# Patient Record
Sex: FEMALE | Race: WHITE | NOT HISPANIC OR LATINO | Employment: OTHER | URBAN - METROPOLITAN AREA
[De-identification: names, ages, dates, MRNs, and addresses within clinical notes are randomized per-mention and may not be internally consistent; named-entity substitution may affect disease eponyms.]

---

## 2017-02-08 ENCOUNTER — HOSPITAL ENCOUNTER (OUTPATIENT)
Dept: OTHER | Facility: HOSPITAL | Age: 72
Setting detail: SPECIMEN
Discharge: HOME | End: 2017-02-08
Attending: UROLOGY | Admitting: UROLOGY

## 2017-02-09 LAB — SPECIMEN SOURCE: NORMAL

## 2017-03-22 ENCOUNTER — HOSPITAL ENCOUNTER (OUTPATIENT)
Dept: MAMMOGRAPHY | Facility: HOSPITAL | Age: 72
Discharge: HOME | End: 2017-03-22
Attending: INTERNAL MEDICINE | Admitting: INTERNAL MEDICINE

## 2017-03-22 DIAGNOSIS — C50.911 MALIGNANT NEOPLASM OF RIGHT FEMALE BREAST, UNSPECIFIED SITE OF BREAST: ICD-10-CM

## 2017-03-22 PROCEDURE — G0202 SCR MAMMO BI INCL CAD: HCPCS

## 2017-03-28 ENCOUNTER — LAB (OUTPATIENT)
Dept: LAB | Facility: HOSPITAL | Age: 72
End: 2017-03-28

## 2017-03-28 ENCOUNTER — OFFICE VISIT (OUTPATIENT)
Dept: ONCOLOGY | Facility: CLINIC | Age: 72
End: 2017-03-28

## 2017-03-28 VITALS
WEIGHT: 176.4 LBS | SYSTOLIC BLOOD PRESSURE: 110 MMHG | DIASTOLIC BLOOD PRESSURE: 82 MMHG | BODY MASS INDEX: 31.25 KG/M2 | HEIGHT: 63 IN | HEART RATE: 66 BPM | RESPIRATION RATE: 16 BRPM | TEMPERATURE: 97.8 F | OXYGEN SATURATION: 95 %

## 2017-03-28 DIAGNOSIS — C50.611 MALIGNANT NEOPLASM OF AXILLARY TAIL OF RIGHT FEMALE BREAST (HCC): Primary | ICD-10-CM

## 2017-03-28 DIAGNOSIS — Z12.31 ENCOUNTER FOR SCREENING MAMMOGRAM FOR BREAST CANCER: ICD-10-CM

## 2017-03-28 LAB
BASOPHILS # BLD AUTO: 0.05 10*3/MM3 (ref 0–0.1)
BASOPHILS NFR BLD AUTO: 0.8 % (ref 0–1.1)
DEPRECATED RDW RBC AUTO: 42.7 FL (ref 37–49)
EOSINOPHIL # BLD AUTO: 0.22 10*3/MM3 (ref 0–0.36)
EOSINOPHIL NFR BLD AUTO: 3.4 % (ref 1–5)
ERYTHROCYTE [DISTWIDTH] IN BLOOD BY AUTOMATED COUNT: 13.2 % (ref 11.7–14.5)
HCT VFR BLD AUTO: 37.2 % (ref 34–45)
HGB BLD-MCNC: 12.4 G/DL (ref 11.5–14.9)
IMM GRANULOCYTES # BLD: 0.02 10*3/MM3 (ref 0–0.03)
IMM GRANULOCYTES NFR BLD: 0.3 % (ref 0–0.5)
LYMPHOCYTES # BLD AUTO: 1.65 10*3/MM3 (ref 1–3.5)
LYMPHOCYTES NFR BLD AUTO: 25.5 % (ref 20–49)
MCH RBC QN AUTO: 29.5 PG (ref 27–33)
MCHC RBC AUTO-ENTMCNC: 33.3 G/DL (ref 32–35)
MCV RBC AUTO: 88.4 FL (ref 83–97)
MONOCYTES # BLD AUTO: 0.57 10*3/MM3 (ref 0.25–0.8)
MONOCYTES NFR BLD AUTO: 8.8 % (ref 4–12)
NEUTROPHILS # BLD AUTO: 3.96 10*3/MM3 (ref 1.5–7)
NEUTROPHILS NFR BLD AUTO: 61.2 % (ref 39–75)
NRBC BLD MANUAL-RTO: 0 /100 WBC (ref 0–0)
PLATELET # BLD AUTO: 222 10*3/MM3 (ref 150–375)
PMV BLD AUTO: 9.8 FL (ref 8.9–12.1)
RBC # BLD AUTO: 4.21 10*6/MM3 (ref 3.9–5)
WBC NRBC COR # BLD: 6.47 10*3/MM3 (ref 4–10)

## 2017-03-28 PROCEDURE — 85025 COMPLETE CBC W/AUTO DIFF WBC: CPT

## 2017-03-28 PROCEDURE — 36415 COLL VENOUS BLD VENIPUNCTURE: CPT

## 2017-03-28 PROCEDURE — 99213 OFFICE O/P EST LOW 20 MIN: CPT | Performed by: INTERNAL MEDICINE

## 2017-03-28 RX ORDER — LISINOPRIL 10 MG/1
20 TABLET ORAL
COMMUNITY
Start: 2017-03-23 | End: 2019-08-12

## 2017-03-28 RX ORDER — CELECOXIB 200 MG/1
CAPSULE ORAL
COMMUNITY
Start: 2017-02-09 | End: 2019-08-12

## 2017-03-28 NOTE — PROGRESS NOTES
Subjective     CHIEF COMPLAINT:      History of stage II right breast cancer    HISTORY OF PRESENT ILLNESS:     Merle Seo is a 72 y.o. female patient with the above medical history.  She returns today for follow-up and reports that she had problems with kidney stones.  She had a procedure for the kidney stones.  She then traveled to [place] with infection.  She has recovered and she is not having any urinary symptoms at present.    The patient reports long-standing history of excessive perspiration.  This has been present for several years.  She drinks plenty of fluids.      Past Medical History:   Diagnosis Date   • Anxiety    • Arthritis    • Breast cancer     Right, stage II   • Drug therapy    • Hypertension    • Left fibular fracture 10/2007       Past Surgical History:   Procedure Laterality Date   • APPENDECTOMY     • AUGMENTATION MAMMAPLASTY     • BILATERAL OOPHORECTOMY  1984    for endometriosis   • BREAST BIOPSY     • CHOLECYSTECTOMY     • COLONOSCOPY  2011   • HYSTERECTOMY  1984    H/O endometriosis   • MASTECTOMY  1997   • MASTOPEXY Left    • OOPHORECTOMY Bilateral 1984   • TRAM FLAP REVISION NIPPLE RECONSTRUCTION BREAST REDUCTION Right 1998       Cancer-related family history includes Breast cancer in her maternal grandmother and other; Cancer in her mother; Ovarian cancer in her sister.  Social History   Substance Use Topics   • Smoking status: Former Smoker   • Smokeless tobacco: Former User   • Alcohol use No       MEDICATIONS:    Current Outpatient Prescriptions:   •  Aspirin (ECOTRIN LOW STRENGTH PO), Take  by mouth daily., Disp: , Rfl:   •  ATORVASTATIN CALCIUM PO, 20 mg daily., Disp: , Rfl:   •  celecoxib (CeleBREX) 200 MG capsule, , Disp: , Rfl:   •  Esomeprazole Magnesium (NEXIUM PO), Take 40 mg by mouth daily., Disp: , Rfl:   •  HYDROCHLOROTHIAZIDE PO, Take 12.5 mg by mouth daily., Disp: , Rfl:   •  lisinopril (PRINIVIL,ZESTRIL) 10 MG tablet, , Disp: , Rfl:   •  METOPROLOL TARTRATE PO, 25  "mg daily., Disp: , Rfl:   •  Omega-3 Fatty Acids (FISH OIL PO), Take 1,000 mg by mouth., Disp: , Rfl:   •  OXYBUTYNIN CHLORIDE PO, 5 mg daily., Disp: , Rfl:   •  Probiotic Product (ALIGN PO), daily., Disp: , Rfl:     ALLERGIES:  No Known Allergies    REVIEW OF SYSTEMS:  GENERAL:  No fever or chills. No weight change.    SKIN:  Excessive prespiration.  HEME/LYMPH: No anemia, easy bruisability or enlarged lymph nodes.  RESPIRATORY:  No cough, shortness of breath, hemoptysis or wheezing.  CVS:  No chest pain, palpitations or lower extremity swelling.  GI:  No abdominal pain, nausea, vomiting, constipation, diarrhea, melena or hematochezia.  :  Recent hospitalization due to kidney stones.   MUSCULOSKELETAL:  No bone pain or joint stiffness.  NEUROLOGICAL:  No dizziness, global weakness, loss of consciousness or seizures.  PSYCHIATRIC:  No anxiety, mood changes or depression.    Objective   VITAL SIGNS:     Vitals:    03/28/17 1129   BP: 110/82   Pulse: 66   Resp: 16   Temp: 97.8 °F (36.6 °C)   TempSrc: Oral   SpO2: 95%   Weight: 176 lb 6.4 oz (80 kg)   Height: 63.39\" (161 cm)  Comment: new height   PainSc: 0-No pain     Body mass index is 30.87 kg/(m^2).     Wt Readings from Last 3 Encounters:   03/28/17 176 lb 6.4 oz (80 kg)   03/28/16 190 lb 12.8 oz (86.5 kg)       PHYSICAL EXAMINATION:  GENERAL: The patient appears in good general condition, not in acute distress.  SKIN: No skin rashes or lesions. No ecchymosis or petechiae.  HEAD: Normocephalic.  NECK: Supple with good ROM. No thyromegaly or masses.  LYMPHATICS: No cervical, supraclavicular or axillary lymphadenopathy.  BREASTS: TRAM flap reconstruction changes in the right. Scar tissue at the inferior aspect at 5-7 O'clock position. Left breast examination was unremarkable.  CHEST: Lungs clear to auscultation. No added sounds.  CARDIAC: Normal S1 & S2. No murmurs.  ABDOMEN: Soft and non-tender. No palpable hepatosplenomegaly or masses.  Scars in the right upper " quadrant from previous surgeries.  EXTREMITIES: No cyanosis or edema.     DIAGNOSTIC DATA:       Results from last 7 days  Lab Units 03/28/17  1116   WBC 10*3/mm3 6.47   NEUTROS ABS 10*3/mm3 3.96   HEMOGLOBIN g/dL 12.4   HEMATOCRIT % 37.2   PLATELETS 10*3/mm3 222           Bilateral mammograms on 3/22/17:  There are no findings suspicious for malignancy in the left  breast or right TRAM flap. Routine follow-up mammography is recommended.    BI-RADS Category 1: Negative.    Assessment/Plan   History of right breast cancer stage IIA (T1 N1 M0) ER/[place] positive, status post right mastectomy and TRAM flap reconstruction. She received adjuvant chemotherapy on the NSABP B-28 study with 4 cycles AC followed by 4 cycles of Taxol.  This was followed by adjuvant tamoxifen for 5 years followed by Femara for 3 years which was completed in 2007. She continues to do well with no evidence of recurrence. Her mammogram from 3/22/17 was negative.  There was an area of scar tissue in the undersurface of the right reconstructed breast but with no overlying skin changes and the changes are consistent with postoperative scarring.    We will repeat bilateral mammograms in March 2018 see her in follow-up in April 2018 with a CBC at her return visit.        Wu Reis MD  03/28/17

## 2018-03-23 ENCOUNTER — HOSPITAL ENCOUNTER (OUTPATIENT)
Dept: MAMMOGRAPHY | Facility: HOSPITAL | Age: 73
Discharge: HOME | End: 2018-03-23
Attending: INTERNAL MEDICINE | Admitting: INTERNAL MEDICINE

## 2018-03-23 DIAGNOSIS — Z12.31 ENCOUNTER FOR SCREENING MAMMOGRAM FOR BREAST CANCER: ICD-10-CM

## 2018-03-23 DIAGNOSIS — C50.611 MALIGNANT NEOPLASM OF AXILLARY TAIL OF RIGHT FEMALE BREAST (HCC): ICD-10-CM

## 2018-03-23 PROCEDURE — 77067 SCR MAMMO BI INCL CAD: CPT

## 2019-02-25 ENCOUNTER — TRANSCRIBE ORDERS (OUTPATIENT)
Dept: ADMINISTRATIVE | Facility: HOSPITAL | Age: 74
End: 2019-02-25

## 2019-02-25 DIAGNOSIS — Z12.31 VISIT FOR SCREENING MAMMOGRAM: Primary | ICD-10-CM

## 2019-03-26 ENCOUNTER — HOSPITAL ENCOUNTER (OUTPATIENT)
Dept: MAMMOGRAPHY | Facility: HOSPITAL | Age: 74
Discharge: HOME | End: 2019-03-26
Admitting: INTERNAL MEDICINE

## 2019-03-26 DIAGNOSIS — Z12.31 VISIT FOR SCREENING MAMMOGRAM: ICD-10-CM

## 2019-03-26 PROCEDURE — 77063 BREAST TOMOSYNTHESIS BI: CPT

## 2019-03-26 PROCEDURE — 77067 SCR MAMMO BI INCL CAD: CPT

## 2019-08-06 DIAGNOSIS — Z85.3 HISTORY OF BREAST CANCER: Primary | ICD-10-CM

## 2019-08-12 ENCOUNTER — LAB (OUTPATIENT)
Dept: LAB | Facility: HOSPITAL | Age: 74
End: 2019-08-12

## 2019-08-12 ENCOUNTER — OFFICE VISIT (OUTPATIENT)
Dept: ONCOLOGY | Facility: CLINIC | Age: 74
End: 2019-08-12

## 2019-08-12 VITALS
HEIGHT: 64 IN | HEART RATE: 60 BPM | BODY MASS INDEX: 31.31 KG/M2 | WEIGHT: 183.4 LBS | DIASTOLIC BLOOD PRESSURE: 78 MMHG | RESPIRATION RATE: 16 BRPM | SYSTOLIC BLOOD PRESSURE: 116 MMHG | TEMPERATURE: 97.5 F | OXYGEN SATURATION: 96 %

## 2019-08-12 DIAGNOSIS — C50.611 MALIGNANT NEOPLASM OF AXILLARY TAIL OF RIGHT BREAST IN FEMALE, ESTROGEN RECEPTOR POSITIVE (HCC): Primary | ICD-10-CM

## 2019-08-12 DIAGNOSIS — Z85.3 HISTORY OF BREAST CANCER: ICD-10-CM

## 2019-08-12 DIAGNOSIS — Z17.0 MALIGNANT NEOPLASM OF AXILLARY TAIL OF RIGHT BREAST IN FEMALE, ESTROGEN RECEPTOR POSITIVE (HCC): Primary | ICD-10-CM

## 2019-08-12 DIAGNOSIS — Z12.31 ENCOUNTER FOR SCREENING MAMMOGRAM FOR BREAST CANCER: ICD-10-CM

## 2019-08-12 LAB
BASOPHILS # BLD AUTO: 0.04 10*3/MM3 (ref 0–0.2)
BASOPHILS NFR BLD AUTO: 0.4 % (ref 0–1.5)
DEPRECATED RDW RBC AUTO: 45.4 FL (ref 37–54)
EOSINOPHIL # BLD AUTO: 0.22 10*3/MM3 (ref 0–0.4)
EOSINOPHIL NFR BLD AUTO: 2.2 % (ref 0.3–6.2)
ERYTHROCYTE [DISTWIDTH] IN BLOOD BY AUTOMATED COUNT: 13.3 % (ref 12.3–15.4)
HCT VFR BLD AUTO: 42.6 % (ref 34–46.6)
HGB BLD-MCNC: 14 G/DL (ref 12–15.9)
IMM GRANULOCYTES # BLD AUTO: 0.05 10*3/MM3 (ref 0–0.05)
IMM GRANULOCYTES NFR BLD AUTO: 0.5 % (ref 0–0.5)
LYMPHOCYTES # BLD AUTO: 2.08 10*3/MM3 (ref 0.7–3.1)
LYMPHOCYTES NFR BLD AUTO: 20.7 % (ref 19.6–45.3)
MCH RBC QN AUTO: 30.8 PG (ref 26.6–33)
MCHC RBC AUTO-ENTMCNC: 32.9 G/DL (ref 31.5–35.7)
MCV RBC AUTO: 93.6 FL (ref 79–97)
MONOCYTES # BLD AUTO: 0.94 10*3/MM3 (ref 0.1–0.9)
MONOCYTES NFR BLD AUTO: 9.4 % (ref 5–12)
NEUTROPHILS # BLD AUTO: 6.72 10*3/MM3 (ref 1.7–7)
NEUTROPHILS NFR BLD AUTO: 66.8 % (ref 42.7–76)
NRBC BLD AUTO-RTO: 0 /100 WBC (ref 0–0.2)
PLATELET # BLD AUTO: 245 10*3/MM3 (ref 140–450)
PMV BLD AUTO: 9.4 FL (ref 6–12)
RBC # BLD AUTO: 4.55 10*6/MM3 (ref 3.77–5.28)
WBC NRBC COR # BLD: 10.05 10*3/MM3 (ref 3.4–10.8)

## 2019-08-12 PROCEDURE — 85025 COMPLETE CBC W/AUTO DIFF WBC: CPT | Performed by: INTERNAL MEDICINE

## 2019-08-12 PROCEDURE — 99213 OFFICE O/P EST LOW 20 MIN: CPT | Performed by: INTERNAL MEDICINE

## 2019-08-12 PROCEDURE — 36416 COLLJ CAPILLARY BLOOD SPEC: CPT | Performed by: INTERNAL MEDICINE

## 2019-08-12 RX ORDER — PANTOPRAZOLE SODIUM 40 MG/1
TABLET, DELAYED RELEASE ORAL
COMMUNITY
Start: 2019-06-19 | End: 2022-08-17

## 2019-08-12 RX ORDER — LISINOPRIL 20 MG/1
TABLET ORAL
COMMUNITY
Start: 2019-05-17

## 2019-08-12 RX ORDER — METOPROLOL SUCCINATE 50 MG/1
TABLET, EXTENDED RELEASE ORAL
COMMUNITY
Start: 2019-06-02

## 2019-08-12 NOTE — PROGRESS NOTES
Subjective     CHIEF COMPLAINT:      Chief Complaint   Patient presents with   • Annual Exam     no concerns       HISTORY OF PRESENT ILLNESS:     Merle Seo is a 74 y.o. female patient who returns today for follow up on her right breast cancer. She performs breast exams monthly and she denies noticing any lumps. She reports shortness of breath but attributes this to her back problems.       REVIEW OF SYSTEMS:  Review of Systems   Constitutional: Positive for fatigue. Negative for chills, fever and unexpected weight change.   HENT: Negative for mouth sores, nosebleeds, sore throat and voice change.    Eyes: Negative for visual disturbance.   Respiratory: Positive for shortness of breath. Negative for cough.    Cardiovascular: Negative for chest pain and leg swelling.   Gastrointestinal: Negative for abdominal pain, blood in stool, constipation, diarrhea, nausea and vomiting.   Genitourinary: Negative for dysuria, frequency and hematuria.   Musculoskeletal: Positive for back pain. Negative for arthralgias and joint swelling.   Skin: Negative for rash.   Neurological: Positive for numbness. Negative for dizziness and headaches.   Hematological: Negative for adenopathy. Bruises/bleeds easily.   Psychiatric/Behavioral: Negative for dysphoric mood. The patient is not nervous/anxious.      I verified the ROS obtained by the MA.      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)    • Osteoporosis    • Overactive bladder    • RLS (restless legs syndrome)        Past Surgical History:   Procedure Laterality Date   • APPENDECTOMY     •  "AUGMENTATION MAMMAPLASTY     • BILATERAL OOPHORECTOMY  1984    for endometriosis   • BREAST BIOPSY     • CHOLECYSTECTOMY     • COLONOSCOPY  2011   • ENDOSCOPY  2013   • ENDOSCOPY  2015   • HYSTERECTOMY  1984    H/O endometriosis   • MASTECTOMY  1997   • MASTOPEXY Left    • OOPHORECTOMY Bilateral 1984   • TRAM FLAP REVISION NIPPLE RECONSTRUCTION BREAST REDUCTION Right 1998       Cancer-related family history includes Breast cancer in her maternal grandmother and other; Cancer in her mother; Ovarian cancer in her sister; Thyroid cancer in her sister.  Social History     Tobacco Use   • Smoking status: Former Smoker   • Smokeless tobacco: Former User   Substance Use Topics   • Alcohol use: No       MEDICATIONS:    Current Outpatient Medications:   •  ATORVASTATIN CALCIUM PO, 20 mg daily., Disp: , Rfl:   •  Cholecalciferol (VITAMIN D) 1000 units tablet, Take 1,000 Units by mouth Daily., Disp: , Rfl:   •  lisinopril (PRINIVIL,ZESTRIL) 20 MG tablet, , Disp: , Rfl:   •  metoprolol succinate XL (TOPROL-XL) 50 MG 24 hr tablet, TAKE 1 TABLET DAILY, Disp: , Rfl:   •  OXYBUTYNIN CHLORIDE PO, 5 mg daily., Disp: , Rfl:   •  pantoprazole (PROTONIX) 40 MG EC tablet, , Disp: , Rfl:   •  sertraline (ZOLOFT) 50 MG tablet, , Disp: , Rfl:     ALLERGIES:  Allergies   Allergen Reactions   • Nsaids Unknown (See Comments)     History of ischemic colitis         Objective   VITAL SIGNS:     Vitals:    08/12/19 1429   BP: 116/78   Pulse: 60   Resp: 16   Temp: 97.5 °F (36.4 °C)   TempSrc: Oral   SpO2: 96%   Weight: 83.2 kg (183 lb 6.4 oz)   Height: 163 cm (64.17\")  Comment: new    PainSc:   4   PainLoc: Back     Body mass index is 31.31 kg/m².     Wt Readings from Last 3 Encounters:   08/12/19 83.2 kg (183 lb 6.4 oz)   03/28/17 80 kg (176 lb 6.4 oz)   03/28/16 86.5 kg (190 lb 12.8 oz)       PHYSICAL EXAMINATION:  GENERAL:  The patient appears in good general condition, not in acute distress.  SKIN: Warm and dry. No skin rashes, ecchymosis or " petechiae.  HEAD:  Normocephalic.  EYES:  No Jaundice. No Pallor. Pupils equal. EOMI.  NECK:  Supple with Good ROM. No Thyromegaly. No Masses.  LYMPHATICS:  No cervical or supraclavicular or axillary lymphadenopathy.  BREASTS: right breast exam showed post mastectomy and reconstruction. There is an area of scar tissue at 5-7 O'Clock with no changes of the overlying skin. Left breast exam is unremarkable.   CHEST: Normal respiratory effort. Lungs clear to auscultation.   CARDIAC:  Normal S1 & S2. No murmurs. No edema.  ABDOMEN:  Soft. No tenderness. No Hepatomegaly. No Splenomegaly. No masses.        DIAGNOSTIC DATA:     Results from last 7 days   Lab Units 08/12/19  1418   WBC 10*3/mm3 10.05   NEUTROS ABS 10*3/mm3 6.72   HEMOGLOBIN g/dL 14.0   HEMATOCRIT % 42.6   PLATELETS 10*3/mm3 245       BILATERAL DIGITAL MAMMOGRAPHY WITH R2 COMPUTER-AIDED DETECTION AND  DIGITAL TOMOSYNTHESIS     HISTORY: 74-year-old asymptomatic female status post prior right  mastectomy with TRAM flap reconstruction for breast cancer.     FINDINGS: Bilateral digital CC and MLO mammographic and tomosynthesis  images were obtained. Comparison is made to prior examinations dated  03/23/2018 and 03/22/2017. The parenchyma of the left breast is  fatty-replaced. I see no masses or areas of architectural distortion.  There is no evidence of skin thickening or nipple retraction. No  evidence for axillary adenopathy in either axilla is appreciated.  Evidence of prior right mastectomy with TRAM flap reconstruction is  noted. An area of fat necrosis within the posterior one-third inferior  aspect of the right breast is stable.     IMPRESSION:  There are no findings suspicious for malignancy in the left  breast or right TRAM flap. Routine follow-up mammography is recommended.     BI-RADS Category 2: Benign.     This report was finalized on 3/27/2019 8:35 AM by Dr. Apollo Bowman M.D.         Assessment/Plan   History of right breast cancer stage IIA (T1  N1 M0) ER/[place] positive  · status post right mastectomy and TRAM flap reconstruction.   · She received adjuvant chemotherapy on the NSABP B-28 study with 4 cycles AC followed by 4 cycles of Taxol.   · She received adjuvant tamoxifen for 5 years followed by Femara for 3 years which was completed in 2007.     The patient is doing well with no evidence of recurrence or metastasis. Her breast exam is unremarkable. Her last mammograms were negative.     We will see her in follow up in 1 year with a CBC. We will schedule her to have her annual mammograms in late March 2020.        Wu Reis MD  08/12/19

## 2020-03-27 ENCOUNTER — HOSPITAL ENCOUNTER (OUTPATIENT)
Dept: MAMMOGRAPHY | Facility: HOSPITAL | Age: 75
Discharge: HOME | End: 2020-03-27
Admitting: INTERNAL MEDICINE

## 2020-03-27 DIAGNOSIS — Z12.31 ENCOUNTER FOR SCREENING MAMMOGRAM FOR BREAST CANCER: ICD-10-CM

## 2020-03-27 DIAGNOSIS — C50.611 MALIGNANT NEOPLASM OF AXILLARY TAIL OF RIGHT BREAST IN FEMALE, ESTROGEN RECEPTOR POSITIVE (HCC): ICD-10-CM

## 2020-03-27 DIAGNOSIS — Z17.0 MALIGNANT NEOPLASM OF AXILLARY TAIL OF RIGHT BREAST IN FEMALE, ESTROGEN RECEPTOR POSITIVE (HCC): ICD-10-CM

## 2020-03-27 PROCEDURE — 77067 SCR MAMMO BI INCL CAD: CPT

## 2020-03-27 PROCEDURE — 77063 BREAST TOMOSYNTHESIS BI: CPT

## 2020-08-10 ENCOUNTER — OFFICE VISIT (OUTPATIENT)
Dept: ONCOLOGY | Facility: CLINIC | Age: 75
End: 2020-08-10

## 2020-08-10 ENCOUNTER — LAB (OUTPATIENT)
Dept: LAB | Facility: HOSPITAL | Age: 75
End: 2020-08-10

## 2020-08-10 VITALS
BODY MASS INDEX: 31.96 KG/M2 | OXYGEN SATURATION: 93 % | HEART RATE: 65 BPM | RESPIRATION RATE: 16 BRPM | WEIGHT: 180.4 LBS | SYSTOLIC BLOOD PRESSURE: 111 MMHG | TEMPERATURE: 97.3 F | HEIGHT: 63 IN | DIASTOLIC BLOOD PRESSURE: 69 MMHG

## 2020-08-10 DIAGNOSIS — M75.81 RIGHT ROTATOR CUFF TENDINITIS: ICD-10-CM

## 2020-08-10 DIAGNOSIS — R23.3 EASY BRUISING: ICD-10-CM

## 2020-08-10 DIAGNOSIS — C50.611 MALIGNANT NEOPLASM OF AXILLARY TAIL OF RIGHT BREAST IN FEMALE, ESTROGEN RECEPTOR POSITIVE (HCC): Primary | ICD-10-CM

## 2020-08-10 DIAGNOSIS — C50.611 MALIGNANT NEOPLASM OF AXILLARY TAIL OF RIGHT BREAST IN FEMALE, ESTROGEN RECEPTOR POSITIVE (HCC): ICD-10-CM

## 2020-08-10 DIAGNOSIS — Z12.31 ENCOUNTER FOR SCREENING MAMMOGRAM FOR BREAST CANCER: ICD-10-CM

## 2020-08-10 DIAGNOSIS — Z17.0 MALIGNANT NEOPLASM OF AXILLARY TAIL OF RIGHT BREAST IN FEMALE, ESTROGEN RECEPTOR POSITIVE (HCC): Primary | ICD-10-CM

## 2020-08-10 DIAGNOSIS — Z17.0 MALIGNANT NEOPLASM OF AXILLARY TAIL OF RIGHT BREAST IN FEMALE, ESTROGEN RECEPTOR POSITIVE (HCC): ICD-10-CM

## 2020-08-10 LAB
BASOPHILS # BLD AUTO: 0.04 10*3/MM3 (ref 0–0.2)
BASOPHILS NFR BLD AUTO: 0.5 % (ref 0–1.5)
DEPRECATED RDW RBC AUTO: 44.3 FL (ref 37–54)
EOSINOPHIL # BLD AUTO: 0.2 10*3/MM3 (ref 0–0.4)
EOSINOPHIL NFR BLD AUTO: 2.6 % (ref 0.3–6.2)
ERYTHROCYTE [DISTWIDTH] IN BLOOD BY AUTOMATED COUNT: 13.4 % (ref 12.3–15.4)
HCT VFR BLD AUTO: 40 % (ref 34–46.6)
HGB BLD-MCNC: 13.5 G/DL (ref 12–15.9)
IMM GRANULOCYTES # BLD AUTO: 0.07 10*3/MM3 (ref 0–0.05)
IMM GRANULOCYTES NFR BLD AUTO: 0.9 % (ref 0–0.5)
LYMPHOCYTES # BLD AUTO: 1.97 10*3/MM3 (ref 0.7–3.1)
LYMPHOCYTES NFR BLD AUTO: 25.6 % (ref 19.6–45.3)
MCH RBC QN AUTO: 30.4 PG (ref 26.6–33)
MCHC RBC AUTO-ENTMCNC: 33.8 G/DL (ref 31.5–35.7)
MCV RBC AUTO: 90.1 FL (ref 79–97)
MONOCYTES # BLD AUTO: 0.84 10*3/MM3 (ref 0.1–0.9)
MONOCYTES NFR BLD AUTO: 10.9 % (ref 5–12)
NEUTROPHILS NFR BLD AUTO: 4.59 10*3/MM3 (ref 1.7–7)
NEUTROPHILS NFR BLD AUTO: 59.5 % (ref 42.7–76)
NRBC BLD AUTO-RTO: 0 /100 WBC (ref 0–0.2)
PLATELET # BLD AUTO: 216 10*3/MM3 (ref 140–450)
PMV BLD AUTO: 9.7 FL (ref 6–12)
RBC # BLD AUTO: 4.44 10*6/MM3 (ref 3.77–5.28)
WBC # BLD AUTO: 7.71 10*3/MM3 (ref 3.4–10.8)

## 2020-08-10 PROCEDURE — 85025 COMPLETE CBC W/AUTO DIFF WBC: CPT

## 2020-08-10 PROCEDURE — 36415 COLL VENOUS BLD VENIPUNCTURE: CPT

## 2020-08-10 PROCEDURE — 99214 OFFICE O/P EST MOD 30 MIN: CPT | Performed by: INTERNAL MEDICINE

## 2020-08-10 NOTE — PROGRESS NOTES
Subjective     CHIEF COMPLAINT:      Chief Complaint   Patient presents with   • Follow-up     discuss rt arm swelling       HISTORY OF PRESENT ILLNESS:     Merle Seo is a 75 y.o. female patient who returns today for follow up on her right breast cancer.  She returns today for follow-up and reports pain in the right shoulder and right upper extremity.  It started more than a year ago.  In addition, she noticed swelling of the right arm.  The pain is intermittent and it worsens with movement of the right arm.  It does not awaken the patient from sleep.  The right arm swelling recently improved.    Patient also reports easy bruising.  She is developing bruises without trauma.      REVIEW OF SYSTEMS:  Review of Systems   Constitutional: Negative for fatigue, fever and unexpected weight change.   HENT: Negative for nosebleeds and voice change.    Eyes: Negative for visual disturbance.   Respiratory: Negative for cough and shortness of breath.    Cardiovascular: Negative for chest pain and leg swelling.   Gastrointestinal: Negative for abdominal pain, blood in stool, constipation, diarrhea, nausea and vomiting.   Genitourinary: Negative for frequency and hematuria.   Musculoskeletal: Positive for back pain. Negative for joint swelling.   Skin: Negative for rash.   Neurological: Negative for dizziness and headaches.   Hematological: Negative for adenopathy. Bruises/bleeds easily.   Psychiatric/Behavioral: Negative for dysphoric mood. The patient is not nervous/anxious.      I reviewed and verified the CC and ROS obtained by the MA.     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)    • Osteoporosis    • Overactive bladder    • RLS (restless legs syndrome)        Past Surgical History:   Procedure Laterality Date   • APPENDECTOMY     • AUGMENTATION MAMMAPLASTY     • BILATERAL OOPHORECTOMY  1984    for endometriosis   • BREAST BIOPSY     • CHOLECYSTECTOMY     • COLONOSCOPY  2011   • ENDOSCOPY  2013   • ENDOSCOPY  2015   • HYSTERECTOMY  1984    H/O endometriosis   • MASTECTOMY  1997   • MASTOPEXY Left    • OOPHORECTOMY Bilateral 1984   • TRAM FLAP REVISION NIPPLE RECONSTRUCTION BREAST REDUCTION Right 1998       Cancer-related family history includes Breast cancer in her maternal grandmother and another family member; Cancer in her mother; Ovarian cancer in her sister; Thyroid cancer in her sister.  Social History     Tobacco Use   • Smoking status: Former Smoker   • Smokeless tobacco: Former User   Substance Use Topics   • Alcohol use: No       MEDICATIONS:    Current Outpatient Medications:   •  Ascorbic Acid (VITAMIN C PO), Take  by mouth Daily., Disp: , Rfl:   •  ATORVASTATIN CALCIUM PO, 20 mg daily., Disp: , Rfl:   •  lisinopril (PRINIVIL,ZESTRIL) 20 MG tablet, , Disp: , Rfl:   •  metoprolol succinate XL (TOPROL-XL) 50 MG 24 hr tablet, TAKE 1 TABLET DAILY, Disp: , Rfl:   •  Multiple Vitamins-Minerals (ZINC PO), Take  by mouth Daily., Disp: , Rfl:   •  OXYBUTYNIN CHLORIDE PO, 5 mg daily., Disp: , Rfl:   •  pantoprazole (PROTONIX) 40 MG EC tablet, , Disp: , Rfl:   •  sertraline (ZOLOFT) 50 MG tablet, , Disp: , Rfl:   •  VITAMIN D PO, Take  by mouth Daily., Disp: , Rfl:     ALLERGIES:  Allergies   Allergen Reactions   • Nsaids Unknown - Low Severity     History of ischemic colitis         Objective   VITAL SIGNS:     Vitals:    08/10/20 1508   BP: 111/69   Pulse: 65   Resp: 16   Temp: 97.3 °F (36.3 °C)   TempSrc: Temporal   SpO2: 93%   Weight: 81.8 kg (180 lb 6.4 oz)   Height: 161 cm (63.39\")  Comment: new ht   PainSc: 0-No pain     Body mass " index is 31.57 kg/m².     Wt Readings from Last 3 Encounters:   08/10/20 81.8 kg (180 lb 6.4 oz)   08/12/19 83.2 kg (183 lb 6.4 oz)   03/28/17 80 kg (176 lb 6.4 oz)       PHYSICAL EXAMINATION:  GENERAL:  The patient appears in good general condition, not in acute distress.  SKIN: No skin rashes.  Bilateral leg ecchymosis, more severe on the left.  HEAD:  Normocephalic.  EYES:  No Jaundice. No Pallor. Pupils equal. EOMI.  NECK:  Supple with Good ROM. No Thyromegaly. No Masses.  LYMPHATICS:  No cervical or supraclavicular or axillary lymphadenopathy.  BREASTS: Right breast with TRAM flap reconstruction changes.  Left breast exam revealed fibrocystic changes.  No suspicious masses.  CHEST: Normal respiratory effort.   CARDIAC:  No edema.  ABDOMEN:  Soft. No tenderness. No Hepatomegaly. No Splenomegaly. No masses.  EXTREMITIES: Tenderness on exam of the right shoulder joint area.  No tenderness in the right arm.  No erythema or warmth in the right arm or forearm.  NEUROLOGICAL:  No Focal neurological deficits.       DIAGNOSTIC DATA:     Results from last 7 days   Lab Units 08/10/20  1455   WBC 10*3/mm3 7.71   NEUTROS ABS 10*3/mm3 4.59   HEMOGLOBIN g/dL 13.5   HEMATOCRIT % 40.0   PLATELETS 10*3/mm3 216       BILATERAL DIGITAL MAMMOGRAPHY WITH R2 COMPUTER DETECTION AND DIGITAL  TOMOSYNTHESIS on 3/27/2020:     HISTORY: 75-year-old female status post prior right mastectomy with TRAM  flat reconstruction for breast cancer presenting for routine evaluation  of both breasts.     FINDINGS: Bilateral digital CC and MLO mammographic and tomosynthesis  images were obtained. Comparison is made to prior examinations dated  03/26/2019, 03/23/2018 and 03/22/2017. The parenchyma of the left breast  is fatty-replaced. I see no masses or areas of architectural distortion.  There is no evidence for skin thickening or nipple retraction. No  evidence for axillary adenopathy is appreciated. No suspicious findings  are seen within the right  TRAM flap. A stable area of dystrophic  calcification in the right breast is noted.     IMPRESSION:  There are no findings suspicious for malignancy in the left  breast or right TRAM flap. Routine follow-up mammography is recommended.     BI-RADS Category 1: Negative.      This report was finalized on 3/27/2020 4:33 PM by Dr. Apollo Bowman M.D.    Assessment/Plan   1.  History of right breast cancer stage IIA (T1 N1 M0) ER/[place] positive  · status post right mastectomy and TRAM flap reconstruction.   · She received adjuvant chemotherapy on the NSABP B-28 study with 4 cycles AC followed by 4 cycles of Taxol.   · She received adjuvant tamoxifen for 5 years followed by Femara for 3 years which was completed in 2007.   · Last mammogram from 3/27/2020 was benign.  · Patient is doing well with no evidence of recurrence.    2.  Pain in the right shoulder and right upper extremity.  Exam is consistent with changes related to the rotator cuff.  I reassured the patient that there the findings are not typical for metastasis to bone.    3.  Easy bruisability.  She has bruises in the lower extremities, more severe on the left.  She is not on antiplatelet or anticoagulant.  She does not take NSAIDs.  Platelet count is normal today at 216,000.  This is likely secondary to nutritional vitamin K deficiency.    PLAN:    1.  Continue monthly breast self-exam.  2.  Start vitamin K 1 tablet daily.  3.  We will schedule bilateral mammograms in late March 2021.  4.  Follow-up in 1 year with a CBC and vitamin K levels.        Wu Reis MD  08/10/20

## 2021-04-14 ENCOUNTER — HOSPITAL ENCOUNTER (OUTPATIENT)
Dept: MAMMOGRAPHY | Facility: HOSPITAL | Age: 76
Discharge: HOME | End: 2021-04-14
Admitting: INTERNAL MEDICINE

## 2021-04-14 DIAGNOSIS — C50.611 MALIGNANT NEOPLASM OF AXILLARY TAIL OF RIGHT BREAST IN FEMALE, ESTROGEN RECEPTOR POSITIVE (HCC): ICD-10-CM

## 2021-04-14 DIAGNOSIS — Z17.0 MALIGNANT NEOPLASM OF AXILLARY TAIL OF RIGHT BREAST IN FEMALE, ESTROGEN RECEPTOR POSITIVE (HCC): ICD-10-CM

## 2021-04-14 DIAGNOSIS — Z12.31 ENCOUNTER FOR SCREENING MAMMOGRAM FOR BREAST CANCER: ICD-10-CM

## 2021-04-14 PROCEDURE — 77067 SCR MAMMO BI INCL CAD: CPT

## 2021-04-14 PROCEDURE — 77063 BREAST TOMOSYNTHESIS BI: CPT

## 2021-08-09 ENCOUNTER — LAB (OUTPATIENT)
Dept: LAB | Facility: HOSPITAL | Age: 76
End: 2021-08-09

## 2021-08-09 ENCOUNTER — OFFICE VISIT (OUTPATIENT)
Dept: ONCOLOGY | Facility: CLINIC | Age: 76
End: 2021-08-09

## 2021-08-09 VITALS
OXYGEN SATURATION: 94 % | RESPIRATION RATE: 18 BRPM | HEIGHT: 63 IN | BODY MASS INDEX: 32.3 KG/M2 | HEART RATE: 65 BPM | TEMPERATURE: 96.6 F | SYSTOLIC BLOOD PRESSURE: 121 MMHG | DIASTOLIC BLOOD PRESSURE: 83 MMHG | WEIGHT: 182.3 LBS

## 2021-08-09 DIAGNOSIS — C50.611 MALIGNANT NEOPLASM OF AXILLARY TAIL OF RIGHT BREAST IN FEMALE, ESTROGEN RECEPTOR POSITIVE (HCC): Primary | ICD-10-CM

## 2021-08-09 DIAGNOSIS — R23.3 EASY BRUISING: ICD-10-CM

## 2021-08-09 DIAGNOSIS — C50.611 MALIGNANT NEOPLASM OF AXILLARY TAIL OF RIGHT BREAST IN FEMALE, ESTROGEN RECEPTOR POSITIVE (HCC): ICD-10-CM

## 2021-08-09 DIAGNOSIS — Z17.0 MALIGNANT NEOPLASM OF AXILLARY TAIL OF RIGHT BREAST IN FEMALE, ESTROGEN RECEPTOR POSITIVE (HCC): Primary | ICD-10-CM

## 2021-08-09 DIAGNOSIS — Z17.0 MALIGNANT NEOPLASM OF AXILLARY TAIL OF RIGHT BREAST IN FEMALE, ESTROGEN RECEPTOR POSITIVE (HCC): ICD-10-CM

## 2021-08-09 LAB
BASOPHILS # BLD AUTO: 0.05 10*3/MM3 (ref 0–0.2)
BASOPHILS NFR BLD AUTO: 0.7 % (ref 0–1.5)
DEPRECATED RDW RBC AUTO: 42.9 FL (ref 37–54)
EOSINOPHIL # BLD AUTO: 0.33 10*3/MM3 (ref 0–0.4)
EOSINOPHIL NFR BLD AUTO: 4.4 % (ref 0.3–6.2)
ERYTHROCYTE [DISTWIDTH] IN BLOOD BY AUTOMATED COUNT: 13.1 % (ref 12.3–15.4)
HCT VFR BLD AUTO: 40.9 % (ref 34–46.6)
HGB BLD-MCNC: 13.3 G/DL (ref 12–15.9)
IMM GRANULOCYTES # BLD AUTO: 0.03 10*3/MM3 (ref 0–0.05)
IMM GRANULOCYTES NFR BLD AUTO: 0.4 % (ref 0–0.5)
LYMPHOCYTES # BLD AUTO: 1.72 10*3/MM3 (ref 0.7–3.1)
LYMPHOCYTES NFR BLD AUTO: 23 % (ref 19.6–45.3)
MCH RBC QN AUTO: 29.2 PG (ref 26.6–33)
MCHC RBC AUTO-ENTMCNC: 32.5 G/DL (ref 31.5–35.7)
MCV RBC AUTO: 89.7 FL (ref 79–97)
MONOCYTES # BLD AUTO: 0.92 10*3/MM3 (ref 0.1–0.9)
MONOCYTES NFR BLD AUTO: 12.3 % (ref 5–12)
NEUTROPHILS NFR BLD AUTO: 4.44 10*3/MM3 (ref 1.7–7)
NEUTROPHILS NFR BLD AUTO: 59.2 % (ref 42.7–76)
NRBC BLD AUTO-RTO: 0 /100 WBC (ref 0–0.2)
PLATELET # BLD AUTO: 227 10*3/MM3 (ref 140–450)
PMV BLD AUTO: 9.1 FL (ref 6–12)
RBC # BLD AUTO: 4.56 10*6/MM3 (ref 3.77–5.28)
WBC # BLD AUTO: 7.49 10*3/MM3 (ref 3.4–10.8)

## 2021-08-09 PROCEDURE — 99213 OFFICE O/P EST LOW 20 MIN: CPT | Performed by: INTERNAL MEDICINE

## 2021-08-09 PROCEDURE — 36415 COLL VENOUS BLD VENIPUNCTURE: CPT

## 2021-08-09 PROCEDURE — 85025 COMPLETE CBC W/AUTO DIFF WBC: CPT

## 2021-08-09 NOTE — PROGRESS NOTES
Subjective     CHIEF COMPLAINT:      Chief Complaint   Patient presents with   • Follow-up       HISTORY OF PRESENT ILLNESS:     Merle Seo is a 76 y.o. female patient who returns today for follow up on her right breast cancer.    This patient is here today with no specific issues besides she is very sad because she left her  in 05/2021 as a complication of progressive cancer of the esophagus. His diagnosis was made too late. She has a daughter here who has been instrumental in helping her out and a daughter in [place] who has been very useful to her too. Other than that, she has had a good appetite. She is still cooking for herself. Good bowel activity. Normal urination. She has not had any other significant issues at this point. She denies any issues in her breasts. She is up to date in her mammograms. 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)    • Osteoporosis    • Overactive bladder    • RLS (restless legs syndrome)        Past Surgical History:   Procedure Laterality Date   • APPENDECTOMY     • BILATERAL OOPHORECTOMY  1984    for endometriosis   • BREAST BIOPSY     • CHOLECYSTECTOMY     • COLONOSCOPY  2011   • ENDOSCOPY  2013   • ENDOSCOPY  2015   • HYSTERECTOMY  1984    H/O endometriosis   • MASTECTOMY Right 1997   • MASTOPEXY Left    • OOPHORECTOMY Bilateral 1984   • TRAM FLAP REVISION NIPPLE RECONSTRUCTION BREAST REDUCTION Right 1998       Cancer-related family history includes Breast cancer in her maternal grandmother and  "another family member; Cancer in her mother; Ovarian cancer in her sister; Thyroid cancer in her sister.  Social History     Tobacco Use   • Smoking status: Former Smoker   • Smokeless tobacco: Former User   Substance Use Topics   • Alcohol use: No       MEDICATIONS:    Current Outpatient Medications:   •  Ascorbic Acid (VITAMIN C PO), Take  by mouth Daily., Disp: , Rfl:   •  ATORVASTATIN CALCIUM PO, 20 mg daily., Disp: , Rfl:   •  lisinopril (PRINIVIL,ZESTRIL) 20 MG tablet, , Disp: , Rfl:   •  metoprolol succinate XL (TOPROL-XL) 50 MG 24 hr tablet, TAKE 1 TABLET DAILY, Disp: , Rfl:   •  Multiple Vitamins-Minerals (ZINC PO), Take  by mouth Daily., Disp: , Rfl:   •  OXYBUTYNIN CHLORIDE PO, 5 mg daily., Disp: , Rfl:   •  pantoprazole (PROTONIX) 40 MG EC tablet, , Disp: , Rfl:   •  sertraline (ZOLOFT) 50 MG tablet, , Disp: , Rfl:   •  VITAMIN D PO, Take  by mouth Daily., Disp: , Rfl:     ALLERGIES:  Allergies   Allergen Reactions   • Nsaids Unknown - Low Severity     History of ischemic colitis         Objective   VITAL SIGNS:     Vitals:    08/09/21 1431   BP: 121/83   Pulse: 65   Resp: 18   Temp: 96.6 °F (35.9 °C)   TempSrc: Temporal   SpO2: 94%   Weight: 82.7 kg (182 lb 4.8 oz)   Height: 161 cm (63.39\")   PainSc: 0-No pain     Body mass index is 31.9 kg/m².     Wt Readings from Last 3 Encounters:   08/09/21 82.7 kg (182 lb 4.8 oz)   08/10/20 81.8 kg (180 lb 6.4 oz)   08/12/19 83.2 kg (183 lb 6.4 oz)       PHYSICAL EXAMINATION:    I HAVE PERSONALLY REVIEWED THE HISTORY OF THE PRESENT ILLNESS, PAST MEDICAL HISTORY, FAMILY HISTORY, SOCIAL HISTORY, ALLERGIES, MEDICATIONS STATED ABOVE IN THE  NOTE FROM TODAY.        GENERAL:  Well-developed, well-nourished  Patient  in no acute distress.   SKIN:  Warm, dry ,NO rashes,NO purpura ,NO petechiae.  HEENT:  Pupils were equal and reactive to light and accomodation, conjunctivae noninjected, no pterygium, normal extraocular movements, normal visual acuity.   NECK:  Supple with " good range of motion; no thyromegaly , no other masses, no JVD or bruits, no cervical adenopathies.No carotid artery pain, no carotid abnormal pulsation , NO arterial dance.  LYMPHATICS:  No cervical, NO supraclavicular, NO axillary,NO epitrochlear , NO inguinal adenopathy.  CARDIAC   normal rate and regular rhythm, without murmur,NO rubs NO S3 NO S4 right or left .  LUNGS: normal breath sounds bilateral, no wheezing, rhonchi, crackles or rubs.  INSPECTION of  breast documented symmetry of the tissue per se and location and size of the nipple,no retractions or inversion of the nipple, normal skin without lesions, no erythema or nodules,no peau d'orange, no prominence of superficial veins or chest wall collateral circulation.PALPATION of the breast documented normal skin turgor, no induration, alteration in local temperature, or pain, no palpable masses or nodules, normal mobility of the tissues,no fixation of the tissue or parenchyma to the chest wall, no alteration at the tail of the breasts or axillas, no adenopathies. Surgical site was well healed.No lymphedema in either extremity.  VASCULAR VENOUS: no cyanosis, collateral circulation, varicosities, edema, palpable cords, pain, erythema.  ABDOMEN:  Soft, nontender with no hepatomegaly, no splenomegaly,no masses, no ascites, no collateral circulation,no distention,no [place] sign.  EXTREMITIES  AND SPINE:  No clubbing, cyanosis or edema, OA HANDS deformities , no pain .No kyphosis, scoliosis, no other deformities, no pain in spine, no pain in ribs , no pain inpelvic bone.  NEUROLOGICAL:  Patient was awake, alert, oriented to time, person and place.      DIAGNOSTIC DATA:     Results from last 7 days   Lab Units 08/09/21  1420   WBC 10*3/mm3 7.49   NEUTROS ABS 10*3/mm3 4.44   HEMOGLOBIN g/dL 13.3   HEMATOCRIT % 40.9   PLATELETS 10*3/mm3 227     EXAMINATION: BILATERAL DIGITAL MAMMOGRAPHY WITH R2 COMPUTER DETECTION  AND DIGITAL TOMOSYNTHESIS     HISTORY: 76 year-old  asymptomatic female status post prior right  mastectomy with TRAM flat reconstruction for breast cancer.     FINDINGS: Bilateral digital CC and MLO mammographic and Tomosynthesis  images were obtained. Comparison is made to prior examinations dated  03/27/2020 and 03/26/2019. The parenchyma of the left breast is largely  fatty-replaced. I see no masses or areas of architectural distortion.  There is no evidence for skin thickening or nipple retraction. No  evidence for axillary adenopathy is appreciated. No suspicious findings  are seen within the right TRAM flap.     IMPRESSION:  There are no findings suspicious for malignancy in the left  breast or right TRAM flap. Routine follow-up mammography is recommended.     BI-RADS Category 1: Negative.     This report was finalized on 4/15/2021 1:40 PM by Dr. Apollo Bowman M.D.      Assessment/Plan   1.  History of right breast cancer stage IIA (T1 N1 M0) ER/AZ positive  · status post right mastectomy and TRAM flap reconstruction.   · She received adjuvant chemotherapy on the NSABP B-28 study with 4 cycles AC followed by 4 cycles of Taxol.   · She received adjuvant tamoxifen for 5 years followed by Femara for 3 years which was completed in 2007.   The patient was reviewed on 08/09/2021. Her more recent mammogram is unremarkable. Her breast exam today is normal and she has not had any symptoms that suggest any breast cancer recurrence. Her white count, hemoglobin and platelets are completely normal. Therefore, from the point of view of her breast cancer I think she is doing fine and nothing to suggest recurrent disease.    ·   ·     2. The patient has significant joint pain, especially in the PIPs and DIPs in both hands with local inflammation time to time and occasional morning stiffness. The deformities on clinical examination are typical of osteoarthritis more than rheumatoid arthritis. I think that is where the problem is. She is taking some Tylenol with some mild  benefit and I suggested to continue doing the Tylenol. I think an anti-inflammatory medication on this patient could be a recipe for disaster given her difficulties with the stomach, modifying the benefit of her blood pressure medication and triggering issues in regard to the use of Protonix use and proton pump inhibitor situation.     Finally, her  . I discussed with her the need for her to remain up and open to ideas with old friends, to be sure that she opens the door for her daughters to help her out and to be sure that she does not forget to eat a normal meal on a regular schedule. Typically widowers who lose husbands or wives stop eating normal regular diet. They dislike cooking for only 1 and the diet modifies so much that sometimes they end up with malnutrition. I encouraged her to observe that situation. She agreed to come back in a year with a CBC and a CMP. Next mammogram in the spring of 2022.          Len Galeano MD  21

## 2021-08-13 LAB — PHYTONADIONE SERPL-MCNC: 0.48 NG/ML (ref 0.1–2.2)

## 2022-03-09 ENCOUNTER — TRANSCRIBE ORDERS (OUTPATIENT)
Dept: ADMINISTRATIVE | Facility: HOSPITAL | Age: 77
End: 2022-03-09

## 2022-03-09 DIAGNOSIS — Z12.31 VISIT FOR SCREENING MAMMOGRAM: Primary | ICD-10-CM

## 2022-05-25 ENCOUNTER — HOSPITAL ENCOUNTER (OUTPATIENT)
Dept: MAMMOGRAPHY | Facility: HOSPITAL | Age: 77
Discharge: HOME | End: 2022-05-25
Admitting: INTERNAL MEDICINE

## 2022-05-25 DIAGNOSIS — Z12.31 VISIT FOR SCREENING MAMMOGRAM: ICD-10-CM

## 2022-05-25 PROCEDURE — 77067 SCR MAMMO BI INCL CAD: CPT

## 2022-05-25 PROCEDURE — 77063 BREAST TOMOSYNTHESIS BI: CPT

## 2022-08-17 ENCOUNTER — LAB (OUTPATIENT)
Dept: LAB | Facility: HOSPITAL | Age: 77
End: 2022-08-17

## 2022-08-17 ENCOUNTER — OFFICE VISIT (OUTPATIENT)
Dept: ONCOLOGY | Facility: CLINIC | Age: 77
End: 2022-08-17

## 2022-08-17 VITALS
DIASTOLIC BLOOD PRESSURE: 91 MMHG | WEIGHT: 184.1 LBS | HEIGHT: 64 IN | BODY MASS INDEX: 31.43 KG/M2 | RESPIRATION RATE: 16 BRPM | OXYGEN SATURATION: 96 % | TEMPERATURE: 97.1 F | SYSTOLIC BLOOD PRESSURE: 152 MMHG | HEART RATE: 54 BPM

## 2022-08-17 DIAGNOSIS — K52.1 DIARRHEA DUE TO DRUG: ICD-10-CM

## 2022-08-17 DIAGNOSIS — Z17.0 MALIGNANT NEOPLASM OF AXILLARY TAIL OF RIGHT BREAST IN FEMALE, ESTROGEN RECEPTOR POSITIVE: Primary | ICD-10-CM

## 2022-08-17 DIAGNOSIS — C50.611 MALIGNANT NEOPLASM OF AXILLARY TAIL OF RIGHT BREAST IN FEMALE, ESTROGEN RECEPTOR POSITIVE: Primary | ICD-10-CM

## 2022-08-17 LAB
ALBUMIN SERPL-MCNC: 4.5 G/DL (ref 3.5–5.2)
ALBUMIN/GLOB SERPL: 2.3 G/DL (ref 1.1–2.4)
ALP SERPL-CCNC: 60 U/L (ref 38–116)
ALT SERPL W P-5'-P-CCNC: 23 U/L (ref 0–33)
ANION GAP SERPL CALCULATED.3IONS-SCNC: 12.8 MMOL/L (ref 5–15)
AST SERPL-CCNC: 21 U/L (ref 0–32)
BASOPHILS # BLD AUTO: 0.04 10*3/MM3 (ref 0–0.2)
BASOPHILS NFR BLD AUTO: 0.6 % (ref 0–1.5)
BILIRUB SERPL-MCNC: 0.3 MG/DL (ref 0.2–1.2)
BUN SERPL-MCNC: 22 MG/DL (ref 6–20)
BUN/CREAT SERPL: 22.4 (ref 7.3–30)
CALCIUM SPEC-SCNC: 9.7 MG/DL (ref 8.5–10.2)
CHLORIDE SERPL-SCNC: 107 MMOL/L (ref 98–107)
CO2 SERPL-SCNC: 21.2 MMOL/L (ref 22–29)
CREAT SERPL-MCNC: 0.98 MG/DL (ref 0.6–1.1)
DEPRECATED RDW RBC AUTO: 44.7 FL (ref 37–54)
EGFRCR SERPLBLD CKD-EPI 2021: 58.8 ML/MIN/1.73
EOSINOPHIL # BLD AUTO: 0.4 10*3/MM3 (ref 0–0.4)
EOSINOPHIL NFR BLD AUTO: 5.8 % (ref 0.3–6.2)
ERYTHROCYTE [DISTWIDTH] IN BLOOD BY AUTOMATED COUNT: 13.3 % (ref 12.3–15.4)
GLOBULIN UR ELPH-MCNC: 2 GM/DL (ref 1.8–3.5)
GLUCOSE SERPL-MCNC: 114 MG/DL (ref 74–124)
HCT VFR BLD AUTO: 36.3 % (ref 34–46.6)
HGB BLD-MCNC: 12 G/DL (ref 12–15.9)
IMM GRANULOCYTES # BLD AUTO: 0.02 10*3/MM3 (ref 0–0.05)
IMM GRANULOCYTES NFR BLD AUTO: 0.3 % (ref 0–0.5)
LYMPHOCYTES # BLD AUTO: 1.72 10*3/MM3 (ref 0.7–3.1)
LYMPHOCYTES NFR BLD AUTO: 25 % (ref 19.6–45.3)
MCH RBC QN AUTO: 30.6 PG (ref 26.6–33)
MCHC RBC AUTO-ENTMCNC: 33.1 G/DL (ref 31.5–35.7)
MCV RBC AUTO: 92.6 FL (ref 79–97)
MONOCYTES # BLD AUTO: 0.75 10*3/MM3 (ref 0.1–0.9)
MONOCYTES NFR BLD AUTO: 10.9 % (ref 5–12)
NEUTROPHILS NFR BLD AUTO: 3.95 10*3/MM3 (ref 1.7–7)
NEUTROPHILS NFR BLD AUTO: 57.4 % (ref 42.7–76)
NRBC BLD AUTO-RTO: 0 /100 WBC (ref 0–0.2)
PLATELET # BLD AUTO: 208 10*3/MM3 (ref 140–450)
PMV BLD AUTO: 9.4 FL (ref 6–12)
POTASSIUM SERPL-SCNC: 4.4 MMOL/L (ref 3.5–4.7)
PROT SERPL-MCNC: 6.5 G/DL (ref 6.3–8)
RBC # BLD AUTO: 3.92 10*6/MM3 (ref 3.77–5.28)
SODIUM SERPL-SCNC: 141 MMOL/L (ref 134–145)
WBC NRBC COR # BLD: 6.88 10*3/MM3 (ref 3.4–10.8)

## 2022-08-17 PROCEDURE — 85025 COMPLETE CBC W/AUTO DIFF WBC: CPT

## 2022-08-17 PROCEDURE — 36415 COLL VENOUS BLD VENIPUNCTURE: CPT

## 2022-08-17 PROCEDURE — 99213 OFFICE O/P EST LOW 20 MIN: CPT | Performed by: INTERNAL MEDICINE

## 2022-08-17 PROCEDURE — 80053 COMPREHEN METABOLIC PANEL: CPT

## 2022-08-17 NOTE — PROGRESS NOTES
Subjective     CHIEF COMPLAINT:      Chief Complaint   Patient presents with   • Annual Exam     No concerns       HISTORY OF PRESENT ILLNESS:     On 2022 I had the opportunity to see this 77-year-old white female who has a previous history of right breast cancer status post mastectomy, reconstruction and chemotherapy. The patient is here today with no symptoms pertinent to breast cancer including no obvious skeletal pain, no adenopathies, no deterioration in performance status. 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 She remains very active playing this sport since her   last year at least 3 times a week. This gives her something to do, keeps her busy and makes her strong. The only problem that she is experiencing is diarrhea. She is taking vitamin C that she did not know could cause diarrhea and also Protonix or PPI that also is one of the most common sources of diarrhea nowadays. This is becoming inconvenient to the point that she has to know where the bathroom is around her neighborhood to be sure that she is able to reach them and she has Imodium with her all of the time. She has not lost any weight. She has not had any passage of blood in the stool. She has no abdominal pian, distention or cramping and she has no bloating or excessive flatulence. She associates this sometimes to eating excessive amount of tomatoes but this is not the case that triggers the phenomenon all of the time.                 Past Medical History:   Diagnosis Date   • Anxiety    • Arthritis    • Mendoza's esophagus    • Breast cancer (HCC)     Right, stage II   • Depression    • Drug therapy    • GERD (gastroesophageal reflux disease)    • History of vitamin D deficiency    • Hyperlipidemia    • Hypertension    • IBS (irritable bowel syndrome)    • Left fibular fracture 10/2007   • Nephrolithiasis    • Neuropathy due to chemotherapeutic drug (HCC)    • Osteoporosis    • Overactive bladder    • RLS  (restless legs syndrome)        Past Surgical History:   Procedure Laterality Date   • APPENDECTOMY     • BILATERAL OOPHORECTOMY  1984    for endometriosis   • BREAST BIOPSY     • CHOLECYSTECTOMY     • COLONOSCOPY  2011   • ENDOSCOPY  2013   • ENDOSCOPY  2015   • HYSTERECTOMY  1984    H/O endometriosis   • MASTECTOMY Right 1997   • MASTOPEXY Left    • OOPHORECTOMY Bilateral 1984   • TRAM FLAP REVISION NIPPLE RECONSTRUCTION BREAST REDUCTION Right 1998       Cancer-related family history includes Breast cancer in her maternal grandmother and another family member; Cancer in her mother; Ovarian cancer in her sister; Thyroid cancer in her sister.  Social History     Tobacco Use   • Smoking status: Former Smoker   • Smokeless tobacco: Former User   Substance Use Topics   • Alcohol use: No       MEDICATIONS:    Current Outpatient Medications:   •  Ascorbic Acid (VITAMIN C PO), Take  by mouth Daily., Disp: , Rfl:   •  ATORVASTATIN CALCIUM PO, 20 mg daily., Disp: , Rfl:   •  BIOTIN PO, Take  by mouth Daily., Disp: , Rfl:   •  Calcium Carbonate-Vitamin D (CALCIUM-D PO), Take  by mouth Daily., Disp: , Rfl:   •  GABAPENTIN PO, Take  by mouth 3 (Three) Times a Day., Disp: , Rfl:   •  lisinopril (PRINIVIL,ZESTRIL) 20 MG tablet, , Disp: , Rfl:   •  MELATONIN PO, Take  by mouth At Night As Needed., Disp: , Rfl:   •  metoprolol succinate XL (TOPROL-XL) 50 MG 24 hr tablet, TAKE 1 TABLET DAILY, Disp: , Rfl:   •  OXYBUTYNIN CHLORIDE PO, 5 mg daily., Disp: , Rfl:   •  pantoprazole (PROTONIX) 40 MG EC tablet, , Disp: , Rfl:   •  sertraline (ZOLOFT) 50 MG tablet, , Disp: , Rfl:   •  VITAMIN D PO, Take  by mouth Daily., Disp: , Rfl:     ALLERGIES:  Allergies   Allergen Reactions   • Nsaids Unknown - Low Severity     History of ischemic colitis         Objective   VITAL SIGNS:     Vitals:    08/17/22 1339   BP: 152/91   Pulse: 54   Resp: 16   Temp: 97.1 °F (36.2 °C)   TempSrc: Temporal   SpO2: 96%   Weight: 83.5 kg (184 lb 1.6 oz)  "  Height: 162 cm (63.78\")  Comment: new ht   PainSc: 0-No pain     Body mass index is 31.82 kg/m².     Wt Readings from Last 3 Encounters:   08/17/22 83.5 kg (184 lb 1.6 oz)   08/09/21 82.7 kg (182 lb 4.8 oz)   08/10/20 81.8 kg (180 lb 6.4 oz)       PHYSICAL EXAMINATION:        GENERAL:  Well-developed, well-nourished  Patient  in no acute distress.   SKIN:  Warm, dry ,NO purpura ,no rash.  HEENT:  Pupils were equal and reactive to light and accomodation, conjunctivae noninjected, normal extraocular movements, normal visual acuity.   NECK:  Supple with good range of motion; no thyromegaly , no JVD or bruits,.No carotid artery pain, no carotid abnormal pulsation   LYMPHATICS:  No cervical, NO supraclavicular, NO axillary, NO inguinal adenopathies.  CARDIAC   normal rate , regular rhythm, without murmur,NO rubs NO S3 NO S4   LUNGS: normal breath sounds bilateral, no wheezing, NO rhonchi, NO crackles ,NO rubs.  VASCULAR VENOUS: no cyanosis, NO collateral circulation, NO varicosities, NO edema, NO palpable cords, NO pain,NO erythema, NO pigmentation of the skin.  ABDOMEN:  Soft, NO pain,no hepatomegaly, no splenomegaly,no masses, no ascites, no collateral circulation,no distention.  EXTREMITIES  AND SPINE:  No clubbing, no cyanosis ,oa hands deformities , no pain .No kyphosis,  no pain in spine, no pain in ribs , no pain in pelvic bone.  NEUROLOGICAL:  Patient was awake, alert, oriented to time, person and place.  INSPECTION of  breast documented symmetry of the tissue per se and location and size of the nipple,no retractions or inversion of the nipple, normal skin without lesions, no erythema or nodules,no peau d'orange, no prominence of superficial veins or chest wall collateral circulation.PALPATION of the breast documented normal skin turgor, no induration, alteration in local temperature, or pain, no palpable masses or nodules, normal mobility of the tissues,no fixation of the tissue or parenchyma to the chest " wall, no alteration at the tail of the breasts or axillas, no adenopathies. Right breat reconstruction Surgical site was well healed.No lymphedema in either extremity.        DIAGNOSTIC DATA:     Results from last 7 days   Lab Units 08/17/22  1317   WBC 10*3/mm3 6.88   NEUTROS ABS 10*3/mm3 3.95   HEMOGLOBIN g/dL 12.0   HEMATOCRIT % 36.3   PLATELETS 10*3/mm3 208       Assessment & Plan   1.  History of right breast cancer stage IIA (T1 N1 M0) ER/AL positive  · status post right mastectomy and TRAM flap reconstruction.   · She received adjuvant chemotherapy on the NSABP B-28 study with 4 cycles AC followed by 4 cycles of Taxol.   · She received adjuvant tamoxifen for 5 years followed by Femara for 3 years which was completed in 2007.   The patient was reviewed on 08/09/2021. Her more recent mammogram is unremarkable. Her breast exam today is normal and she has not had any symptoms that suggest any breast cancer recurrence. Her white count, hemoglobin and platelets are completely normal. Therefore, from the point of view of her breast cancer I think she is doing fine and nothing to suggest recurrent disease.  On 08/17/2022 the patient has no symptoms or signs that would indicate breast cancer recurrence. She is up to date with her mammogram. Her left breast examination is normal, left axilla is normal. Reconstructed right breast remains normal, right axilla is normal. No lymphedema in either extremity. She has not had any skeletal pain and she has no other new issues. She was advised to remain in observation returning to see us in a year. I advised her to continue having her mammogram once a year.       ·   ·     2. In the last year and since she has been taking PPI and vitamin C she has at least 3-4 loose stools a day. Sometimes she has accidents and she has become embarrassed and fearful of leaving the house because she does not know what is going to happen. She blames tomatoes but she does not eat tomatoes all of  the time to trigger the diarrhea therefore I doubt that that is part of the problem. I reviewed her medication list and there are 2 major culprits, vitamin C that she does not need to take in my opinion as long as her diet is appropriate and I asked her to discontinue this medicine. I also asked her to strongly consider PPI discontinuation and replacement of this with Pepcid Complete and to use on prn basis instead. Maybe this is the most important source of the problem.     The patient does not look like she is losing any weight. In spite of this her appetite is kept and her energy level is normal. Therefore this is probably a functional diarrhea that is associated with medication effect.    I will review her back in a year with a CBC and CMP.            Len Galeano MD  08/17/22

## 2022-12-12 ENCOUNTER — HOSPITAL ENCOUNTER (EMERGENCY)
Facility: HOSPITAL | Age: 77
Discharge: HOME | End: 2022-12-12
Attending: EMERGENCY MEDICINE | Admitting: EMERGENCY MEDICINE

## 2022-12-12 VITALS
BODY MASS INDEX: 29.88 KG/M2 | RESPIRATION RATE: 18 BRPM | HEIGHT: 64 IN | DIASTOLIC BLOOD PRESSURE: 94 MMHG | WEIGHT: 175 LBS | SYSTOLIC BLOOD PRESSURE: 132 MMHG | TEMPERATURE: 97.7 F | HEART RATE: 56 BPM | OXYGEN SATURATION: 94 %

## 2022-12-12 DIAGNOSIS — M54.50 LUMBAR BACK PAIN: Primary | ICD-10-CM

## 2022-12-12 DIAGNOSIS — T14.8XXA MUSCLE STRAIN: ICD-10-CM

## 2022-12-12 PROCEDURE — 99282 EMERGENCY DEPT VISIT SF MDM: CPT

## 2022-12-12 RX ORDER — METAXALONE 800 MG/1
800 TABLET ORAL 3 TIMES DAILY
Qty: 21 TABLET | Refills: 0 | Status: SHIPPED | OUTPATIENT
Start: 2022-12-12

## 2022-12-12 RX ORDER — METHYLPREDNISOLONE 4 MG/1
TABLET ORAL
Qty: 21 TABLET | Refills: 0 | Status: SHIPPED | OUTPATIENT
Start: 2022-12-12

## 2022-12-12 NOTE — ED NOTES
Pt complaining of upper leg pain x1 week. Pt stated that she had been doing some yard work on a ladder and noticed the pain the next day. Pt has been taking tylenol an able to do her ADLs with some mild pain that comes and goes.

## 2022-12-12 NOTE — ED PROVIDER NOTES
Subjective   History of Present Illness  77-year-old female past medical history significant for hypertension lumbar back pain IBS GERD who presents emergency room complaining of 1 weeks worth of right anterior thigh pain.  Patient states that she was out working in the yard trimming trees when she climbed a ladder to trim branches feels like she overdid it because the next day she started having right upper leg pain that is slowly worsened over the last week.  Patient states she also has chronic back pain and that this has been bothering her as well.  Patient is taken Tylenol with some relief.  Patient has not seen primary care physician for such.  Patient denies bowel or bladder problems.  She states that she can do all of her normal activities however the pain is mildly limiting.  Patient states that NSAIDs have caused her to have GI bleeding in the past however she was taking steroids for short time several months ago without any problem.        Review of Systems   Constitutional: Negative for activity change, appetite change, chills, diaphoresis, fatigue and fever.   HENT: Negative for congestion, rhinorrhea and sore throat.    Eyes: Negative for photophobia and visual disturbance.   Respiratory: Negative for cough and shortness of breath.    Cardiovascular: Negative for chest pain, palpitations and leg swelling.   Gastrointestinal: Negative for abdominal distention, abdominal pain, diarrhea, nausea and vomiting.   Genitourinary: Negative for dysuria and flank pain.   Musculoskeletal: Negative for arthralgias and back pain.        Right lower extremity thigh pain   Skin: Negative for rash.   Neurological: Negative for dizziness, weakness and headaches.   Psychiatric/Behavioral: Negative for agitation and behavioral problems.       Past Medical History:   Diagnosis Date   • Anxiety    • Arthritis    • Mendoza's esophagus    • Breast cancer (HCC)     Right, stage II   • Depression    • Drug therapy    • GERD  (gastroesophageal reflux disease)    • History of vitamin D deficiency    • Hyperlipidemia    • Hypertension    • IBS (irritable bowel syndrome)    • Left fibular fracture 10/2007   • Nephrolithiasis    • Neuropathy due to chemotherapeutic drug (HCC)    • Osteoporosis    • Overactive bladder    • RLS (restless legs syndrome)        Allergies   Allergen Reactions   • Nsaids Unknown - Low Severity     History of ischemic colitis       Past Surgical History:   Procedure Laterality Date   • APPENDECTOMY     • BILATERAL OOPHORECTOMY  1984    for endometriosis   • BREAST BIOPSY     • CHOLECYSTECTOMY     • COLONOSCOPY  2011   • ENDOSCOPY  2013   • ENDOSCOPY  2015   • HYSTERECTOMY  1984    H/O endometriosis   • MASTECTOMY Right 1997   • MASTOPEXY Left    • OOPHORECTOMY Bilateral 1984   • TRAM FLAP REVISION NIPPLE RECONSTRUCTION BREAST REDUCTION Right 1998       Family History   Problem Relation Age of Onset   • Cancer Mother         oral cancer    • Diabetes Mother    • Heart disease Mother    • Hypertension Mother    • Ovarian cancer Sister    • Thyroid cancer Sister    • Breast cancer Other    • Breast cancer Maternal Grandmother        Social History     Socioeconomic History   • Marital status:    Tobacco Use   • Smoking status: Former   • Smokeless tobacco: Former   Substance and Sexual Activity   • Alcohol use: Yes     Comment: SOCIAL   • Drug use: No   • Sexual activity: Defer           Objective   Physical Exam  Constitutional:       General: She is not in acute distress.     Appearance: Normal appearance. She is not ill-appearing, toxic-appearing or diaphoretic.   HENT:      Head: Normocephalic and atraumatic.      Nose: Nose normal.      Mouth/Throat:      Mouth: Mucous membranes are moist.   Eyes:      Conjunctiva/sclera: Conjunctivae normal.   Cardiovascular:      Rate and Rhythm: Normal rate.      Pulses: Normal pulses.   Pulmonary:      Effort: Pulmonary effort is normal.   Abdominal:      General:  Abdomen is flat. There is no distension.      Tenderness: There is no abdominal tenderness.   Musculoskeletal:         General: No swelling. Normal range of motion.      Cervical back: Normal range of motion and neck supple.      Thoracic back: Normal.        Back:         Legs:       Comments: Patient has some mild tenderness to palpation in the area shown on her lumbar back.  No bony tenderness and palpation of the area does not recreate her symptoms in her right lower extremity.    Patient has tenderness palpation in area shown on diagram of her anterior right thigh.  This is also recreated with flexion extension of her quad muscles.  Neurovascular intact distally.   Skin:     General: Skin is warm and dry.   Neurological:      General: No focal deficit present.      Mental Status: She is alert and oriented to person, place, and time.   Psychiatric:         Mood and Affect: Mood normal.         Procedures           ED Course                                           MDM  Number of Diagnoses or Management Options  Risk of Complications, Morbidity, and/or Mortality  Presenting problems: low  Diagnostic procedures: low  Management options: low        Final diagnoses:   Lumbar back pain   Muscle strain       ED Disposition  ED Disposition     ED Disposition   Discharge    Condition   Stable    Comment   --             Celina Quiles MD  [address] [address]  [phone]    Schedule an appointment as soon as possible for a visit       [facility]  [address] [facility] [address] [phone]  Go to   As needed         Medication List      New Prescriptions    metaxalone 800 MG tablet  Commonly known as: SKELAXIN  Take 1 tablet by mouth 3 (Three) Times a Day.     methylPREDNISolone 4 MG dose pack  Commonly known as: MEDROL  Take as directed on package instructions.           Where to Get Your Medications      These medications  were sent to [id number] - [place], [place] - [address] AT [facility] [address] - [phone]  - [phone]   [address], [place] [address]    Phone: [phone]   · metaxalone 800 MG tablet  · methylPREDNISolone 4 MG dose pack          Gaurav Mercedes MD  12/12/22 [id number]

## 2023-03-28 ENCOUNTER — TRANSCRIBE ORDERS (OUTPATIENT)
Dept: ADMINISTRATIVE | Facility: HOSPITAL | Age: 78
End: 2023-03-28
Payer: MEDICARE

## 2023-03-28 DIAGNOSIS — Z12.31 SCREENING MAMMOGRAM, ENCOUNTER FOR: Primary | ICD-10-CM

## 2023-05-26 ENCOUNTER — HOSPITAL ENCOUNTER (OUTPATIENT)
Dept: MAMMOGRAPHY | Facility: HOSPITAL | Age: 78
Discharge: HOME | End: 2023-05-26
Admitting: INTERNAL MEDICINE
Payer: MEDICARE

## 2023-05-26 DIAGNOSIS — Z12.31 SCREENING MAMMOGRAM, ENCOUNTER FOR: ICD-10-CM

## 2023-05-26 PROCEDURE — 77067 SCR MAMMO BI INCL CAD: CPT

## 2023-05-26 PROCEDURE — 77063 BREAST TOMOSYNTHESIS BI: CPT

## 2023-08-16 ENCOUNTER — LAB (OUTPATIENT)
Dept: LAB | Facility: HOSPITAL | Age: 78
End: 2023-08-16
Payer: MEDICARE

## 2023-08-16 ENCOUNTER — OFFICE VISIT (OUTPATIENT)
Dept: ONCOLOGY | Facility: CLINIC | Age: 78
End: 2023-08-16
Payer: MEDICARE

## 2023-08-16 VITALS
HEART RATE: 53 BPM | BODY MASS INDEX: 29.86 KG/M2 | WEIGHT: 174.9 LBS | OXYGEN SATURATION: 94 % | SYSTOLIC BLOOD PRESSURE: 122 MMHG | DIASTOLIC BLOOD PRESSURE: 79 MMHG | HEIGHT: 64 IN | TEMPERATURE: 98 F

## 2023-08-16 DIAGNOSIS — I73.9 PAD (PERIPHERAL ARTERY DISEASE): ICD-10-CM

## 2023-08-16 DIAGNOSIS — Z17.0 MALIGNANT NEOPLASM OF AXILLARY TAIL OF RIGHT BREAST IN FEMALE, ESTROGEN RECEPTOR POSITIVE: ICD-10-CM

## 2023-08-16 DIAGNOSIS — K52.1 DIARRHEA DUE TO DRUG: ICD-10-CM

## 2023-08-16 DIAGNOSIS — C50.611 MALIGNANT NEOPLASM OF AXILLARY TAIL OF RIGHT BREAST IN FEMALE, ESTROGEN RECEPTOR POSITIVE: Primary | ICD-10-CM

## 2023-08-16 DIAGNOSIS — R73.9 HYPERGLYCEMIA, UNSPECIFIED: ICD-10-CM

## 2023-08-16 DIAGNOSIS — Z17.0 MALIGNANT NEOPLASM OF AXILLARY TAIL OF RIGHT BREAST IN FEMALE, ESTROGEN RECEPTOR POSITIVE: Primary | ICD-10-CM

## 2023-08-16 DIAGNOSIS — C50.611 MALIGNANT NEOPLASM OF AXILLARY TAIL OF RIGHT BREAST IN FEMALE, ESTROGEN RECEPTOR POSITIVE: ICD-10-CM

## 2023-08-16 LAB
ALBUMIN SERPL-MCNC: 4.3 G/DL (ref 3.5–5.2)
ALBUMIN/GLOB SERPL: 2.4 G/DL
ALP SERPL-CCNC: 52 U/L (ref 39–117)
ALT SERPL W P-5'-P-CCNC: 14 U/L (ref 1–33)
ANION GAP SERPL CALCULATED.3IONS-SCNC: 10.4 MMOL/L (ref 5–15)
AST SERPL-CCNC: 21 U/L (ref 1–32)
BASOPHILS # BLD AUTO: 0.06 10*3/MM3 (ref 0–0.2)
BASOPHILS NFR BLD AUTO: 0.8 % (ref 0–1.5)
BILIRUB SERPL-MCNC: 0.3 MG/DL (ref 0–1.2)
BUN SERPL-MCNC: 23 MG/DL (ref 8–23)
BUN/CREAT SERPL: 24.5 (ref 7–25)
CALCIUM SPEC-SCNC: 9.4 MG/DL (ref 8.6–10.5)
CHLORIDE SERPL-SCNC: 106 MMOL/L (ref 98–107)
CO2 SERPL-SCNC: 24.6 MMOL/L (ref 22–29)
CREAT SERPL-MCNC: 0.94 MG/DL (ref 0.6–1.1)
DEPRECATED RDW RBC AUTO: 45.7 FL (ref 37–54)
EGFRCR SERPLBLD CKD-EPI 2021: 61.5 ML/MIN/1.73
EOSINOPHIL # BLD AUTO: 0.43 10*3/MM3 (ref 0–0.4)
EOSINOPHIL NFR BLD AUTO: 6 % (ref 0.3–6.2)
ERYTHROCYTE [DISTWIDTH] IN BLOOD BY AUTOMATED COUNT: 13.2 % (ref 12.3–15.4)
GLOBULIN UR ELPH-MCNC: 1.8 GM/DL
GLUCOSE SERPL-MCNC: 125 MG/DL (ref 65–99)
HBA1C MFR BLD: 6.5 % (ref 4.8–5.6)
HCT VFR BLD AUTO: 39 % (ref 34–46.6)
HGB BLD-MCNC: 12.1 G/DL (ref 12–15.9)
IMM GRANULOCYTES # BLD AUTO: 0.02 10*3/MM3 (ref 0–0.05)
IMM GRANULOCYTES NFR BLD AUTO: 0.3 % (ref 0–0.5)
LYMPHOCYTES # BLD AUTO: 1.75 10*3/MM3 (ref 0.7–3.1)
LYMPHOCYTES NFR BLD AUTO: 24.6 % (ref 19.6–45.3)
MCH RBC QN AUTO: 29.4 PG (ref 26.6–33)
MCHC RBC AUTO-ENTMCNC: 31 G/DL (ref 31.5–35.7)
MCV RBC AUTO: 94.7 FL (ref 79–97)
MONOCYTES # BLD AUTO: 0.75 10*3/MM3 (ref 0.1–0.9)
MONOCYTES NFR BLD AUTO: 10.5 % (ref 5–12)
NEUTROPHILS NFR BLD AUTO: 4.1 10*3/MM3 (ref 1.7–7)
NEUTROPHILS NFR BLD AUTO: 57.8 % (ref 42.7–76)
NRBC BLD AUTO-RTO: 0 /100 WBC (ref 0–0.2)
PLATELET # BLD AUTO: 197 10*3/MM3 (ref 140–450)
PMV BLD AUTO: 9.9 FL (ref 6–12)
POTASSIUM SERPL-SCNC: 5 MMOL/L (ref 3.5–5.2)
PROT SERPL-MCNC: 6.1 G/DL (ref 6–8.5)
RBC # BLD AUTO: 4.12 10*6/MM3 (ref 3.77–5.28)
SODIUM SERPL-SCNC: 141 MMOL/L (ref 136–145)
WBC NRBC COR # BLD: 7.11 10*3/MM3 (ref 3.4–10.8)

## 2023-08-16 PROCEDURE — 80053 COMPREHEN METABOLIC PANEL: CPT

## 2023-08-16 PROCEDURE — 36415 COLL VENOUS BLD VENIPUNCTURE: CPT

## 2023-08-16 PROCEDURE — 85025 COMPLETE CBC W/AUTO DIFF WBC: CPT

## 2023-08-16 PROCEDURE — 83036 HEMOGLOBIN GLYCOSYLATED A1C: CPT | Performed by: INTERNAL MEDICINE

## 2023-08-16 NOTE — PROGRESS NOTES
Subjective     CHIEF COMPLAINT:  HISTORY OF BREAST CANCER.    No chief complaint on file.      HISTORY OF PRESENT ILLNESS:     On 08/16/2023 this 78-year-old female who has a history of breast cancer returns for regular follow up a year later. Overall she continues doing fine playing golf 3 days a week, having back pain associated with arthritis and joint pain in other sites. Still mobility is ongoing and she is able to do her house job with no limitations. She is starting to be concerned because her feet are numb and cold all of the time and there is atrophy of the skin suggesting some peripheral arterial disease. She has diabetes. She has not had her feet checked for a good while. Besides this her diarrhea discussed last year was associated with PPI and resolved after stopping the medicine. Today she has no symptoms pertinent to her breast cancer. She is up to date with her mammograms.                     Past Medical History:   Diagnosis Date    Anxiety     Arthritis     Mendoza's esophagus     Breast cancer     Right, stage II    Depression     Drug therapy     GERD (gastroesophageal reflux disease)     History of vitamin D deficiency     Hyperlipidemia     Hypertension     IBS (irritable bowel syndrome)     Left fibular fracture 10/2007    Nephrolithiasis     Neuropathy due to chemotherapeutic drug     Osteoporosis     Overactive bladder     RLS (restless legs syndrome)        Past Surgical History:   Procedure Laterality Date    APPENDECTOMY      BILATERAL OOPHORECTOMY  1984    for endometriosis    BREAST BIOPSY      CHOLECYSTECTOMY      COLONOSCOPY  2011    ENDOSCOPY  2013    ENDOSCOPY  2015    HYSTERECTOMY  1984    H/O endometriosis    MASTECTOMY Right 1997    MASTOPEXY Left     OOPHORECTOMY Bilateral 1984    TRAM FLAP REVISION NIPPLE RECONSTRUCTION BREAST REDUCTION Right 1998       Cancer-related family history includes Breast cancer in her maternal grandmother and another family member; Cancer in her  mother; Ovarian cancer in her sister; Thyroid cancer in her sister.  Social History     Tobacco Use    Smoking status: Former    Smokeless tobacco: Former   Substance Use Topics    Alcohol use: Yes     Comment: SOCIAL       MEDICATIONS:    Current Outpatient Medications:     ATORVASTATIN CALCIUM PO, 20 mg daily., Disp: , Rfl:     BIOTIN PO, Take  by mouth Daily., Disp: , Rfl:     Calcium Carbonate-Vitamin D (CALCIUM-D PO), Take  by mouth Daily., Disp: , Rfl:     GABAPENTIN PO, Take  by mouth 3 (Three) Times a Day., Disp: , Rfl:     lisinopril (PRINIVIL,ZESTRIL) 20 MG tablet, , Disp: , Rfl:     MELATONIN PO, Take  by mouth At Night As Needed., Disp: , Rfl:     metaxalone (SKELAXIN) 800 MG tablet, Take 1 tablet by mouth 3 (Three) Times a Day., Disp: 21 tablet, Rfl: 0    methylPREDNISolone (MEDROL) 4 MG dose pack, Take as directed on package instructions., Disp: 21 tablet, Rfl: 0    metoprolol succinate XL (TOPROL-XL) 50 MG 24 hr tablet, TAKE 1 TABLET DAILY, Disp: , Rfl:     OXYBUTYNIN CHLORIDE PO, 5 mg daily., Disp: , Rfl:     sertraline (ZOLOFT) 50 MG tablet, , Disp: , Rfl:     VITAMIN D PO, Take  by mouth Daily., Disp: , Rfl:     ALLERGIES:  Allergies   Allergen Reactions    Nsaids Unknown - Low Severity     History of ischemic colitis         Objective   VITAL SIGNS:     There were no vitals filed for this visit.    There is no height or weight on file to calculate BMI.     Wt Readings from Last 3 Encounters:   12/12/22 79.4 kg (175 lb)   08/17/22 83.5 kg (184 lb 1.6 oz)   08/09/21 82.7 kg (182 lb 4.8 oz)       PHYSICAL EXAMINATION:              GENERAL:  Well-developed, Patient  in no acute distress.   SKIN:  Warm, dry ,NO purpura ,no rash.  HEENT:  Pupils were equal and reactive to light and accomodation, conjunctivae noninjected,  normal visual acuity.   NECK:  Supple with good range of motion; no thyromegaly , no JVD or bruits,.No carotid artery pain, no carotid abnormal pulsation   LYMPHATICS:  No cervical, NO  supraclavicular, NO axillary, NO inguinal adenopathies.  CARDIAC   normal rate , regular rhythm, without murmur,NO rubs NO S3 NO S4   LUNGS: normal breath sounds bilateral, no wheezing, NO rhonchi, NO crackles ,NO rubs.  VASCULAR VENOUS: no cyanosis, NO collateral circulation, NO varicosities, NO edema, NO palpable cords, NO pain,NO erythema, NO pigmentation of the skin.  ABDOMEN:  Soft, NO pain,no hepatomegaly, no splenomegaly,no masses, no ascites, no collateral circulation,no distention.  EXTREMITIES  AND SPINE:  No clubbing, no cyanosis ,no deformities , no pain .No kyphosis,  no pain in spine, no pain in ribs , no pain in pelvic bone.  NEUROLOGICAL:  Patient was awake, alert, oriented to time, person and place.  In both lower extremities the patient has lost the elasticity of the skin completely and there is significant atrophy. There is coldness in her feet and decreased pulses popliteal, posterior tibialis and pedis bilaterally. She has significant numbness from the ankles down in both lower extremities. The capillary refill is slow and decreased to my impression.            DIAGNOSTIC DATA:     LABS:  CBC & Differential (08/16/2023 13:20)    Comprehensive Metabolic Panel (08/16/2023 13:20)    IMAGING:      Mammo Screening Digital Tomosynthesis Bilateral With CAD (05/26/2023 09:50)       Assessment & Plan   1.  History of right breast cancer stage IIA (T1 N1 M0) ER/TN positive  status post right mastectomy and TRAM flap reconstruction.   She received adjuvant chemotherapy on the NSABP B-28 study with 4 cycles AC followed by 4 cycles of Taxol.   She received adjuvant tamoxifen for 5 years followed by Femara for 3 years which was completed in 2007.   The patient was reviewed on 08/09/2021. Her more recent mammogram is unremarkable. Her breast exam today is normal and she has not had any symptoms that suggest any breast cancer recurrence. Her white count, hemoglobin and platelets are completely normal.  Therefore, from the point of view of her breast cancer I think she is doing fine and nothing to suggest recurrent disease.  On 08/17/2022 the patient has no symptoms or signs that would indicate breast cancer recurrence. She is up to date with her mammogram. Her left breast examination is normal, left axilla is normal. Reconstructed right breast remains normal, right axilla is normal. No lymphedema in either extremity. She has not had any skeletal pain and she has no other new issues. She was advised to remain in observation returning to see us in a year. I advised her to continue having her mammogram once a year.   On 08/16/2023 the patient has no symptoms or signs of breast cancer recurrence. Roughly done breast examination disclosed no masses or abnormalities and no axillary adenopathies or lymphedema in either extremity. Her white count, hemoglobin and platelets remain normal. Her chemistry profile remains normal. Glucose is elevated. Her mammogram is up to date and it has been posted above. From this point of view the patient will return to see us back in a year with repeat CBC and CMP.                 On 08/16/2023 I am very concerned about the look of her legs. She has neuropathy and she has significant atrophy of the skin with slow capillary refill, numbness, tingling and discomfort. She has no claudication yet but it is not going to be long before this happens. The pulses are decreased in both feet. I think she needs to be seen by vascular surgeon and referral has been made for such. I went ahead and measured her hemoglobin A1c that she requested and we will call her with the report of this.     She will return back in a year with a CBC and CMP.                  Martha Shin RN  08/16/23

## 2024-04-10 ENCOUNTER — TRANSCRIBE ORDERS (OUTPATIENT)
Dept: ADMINISTRATIVE | Facility: HOSPITAL | Age: 79
End: 2024-04-10
Payer: MEDICARE

## 2024-04-10 DIAGNOSIS — Z12.31 VISIT FOR SCREENING MAMMOGRAM: Primary | ICD-10-CM

## 2024-05-29 ENCOUNTER — HOSPITAL ENCOUNTER (OUTPATIENT)
Dept: MAMMOGRAPHY | Facility: HOSPITAL | Age: 79
Discharge: HOME | End: 2024-05-29
Admitting: INTERNAL MEDICINE
Payer: MEDICARE

## 2024-05-29 DIAGNOSIS — Z12.31 VISIT FOR SCREENING MAMMOGRAM: ICD-10-CM

## 2024-05-29 PROCEDURE — 77067 SCR MAMMO BI INCL CAD: CPT

## 2024-05-29 PROCEDURE — 77063 BREAST TOMOSYNTHESIS BI: CPT

## 2024-09-25 ENCOUNTER — OFFICE VISIT (OUTPATIENT)
Dept: ONCOLOGY | Facility: CLINIC | Age: 79
End: 2024-09-25
Payer: MEDICARE

## 2024-09-25 ENCOUNTER — LAB (OUTPATIENT)
Dept: LAB | Facility: HOSPITAL | Age: 79
End: 2024-09-25
Payer: MEDICARE

## 2024-09-25 VITALS
RESPIRATION RATE: 16 BRPM | HEIGHT: 64 IN | HEART RATE: 76 BPM | SYSTOLIC BLOOD PRESSURE: 83 MMHG | DIASTOLIC BLOOD PRESSURE: 60 MMHG | WEIGHT: 170 LBS | TEMPERATURE: 98 F | OXYGEN SATURATION: 95 % | BODY MASS INDEX: 29.02 KG/M2

## 2024-09-25 DIAGNOSIS — Z17.0 MALIGNANT NEOPLASM OF AXILLARY TAIL OF RIGHT BREAST IN FEMALE, ESTROGEN RECEPTOR POSITIVE: Primary | ICD-10-CM

## 2024-09-25 DIAGNOSIS — C50.611 MALIGNANT NEOPLASM OF AXILLARY TAIL OF RIGHT BREAST IN FEMALE, ESTROGEN RECEPTOR POSITIVE: ICD-10-CM

## 2024-09-25 DIAGNOSIS — Z17.0 MALIGNANT NEOPLASM OF AXILLARY TAIL OF RIGHT BREAST IN FEMALE, ESTROGEN RECEPTOR POSITIVE: ICD-10-CM

## 2024-09-25 DIAGNOSIS — C50.611 MALIGNANT NEOPLASM OF AXILLARY TAIL OF RIGHT BREAST IN FEMALE, ESTROGEN RECEPTOR POSITIVE: Primary | ICD-10-CM

## 2024-09-25 LAB
ALBUMIN SERPL-MCNC: 4.8 G/DL (ref 3.5–5.2)
ALBUMIN/GLOB SERPL: 2.1 G/DL
ALP SERPL-CCNC: 57 U/L (ref 39–117)
ALT SERPL W P-5'-P-CCNC: 21 U/L (ref 1–33)
ANION GAP SERPL CALCULATED.3IONS-SCNC: 12.3 MMOL/L (ref 5–15)
AST SERPL-CCNC: 20 U/L (ref 1–32)
BASOPHILS # BLD AUTO: 0.03 10*3/MM3 (ref 0–0.2)
BASOPHILS NFR BLD AUTO: 0.4 % (ref 0–1.5)
BILIRUB SERPL-MCNC: 0.5 MG/DL (ref 0–1.2)
BUN SERPL-MCNC: 21 MG/DL (ref 8–23)
BUN/CREAT SERPL: 18.3 (ref 7–25)
CALCIUM SPEC-SCNC: 10.2 MG/DL (ref 8.6–10.5)
CHLORIDE SERPL-SCNC: 102 MMOL/L (ref 98–107)
CO2 SERPL-SCNC: 23.7 MMOL/L (ref 22–29)
CREAT SERPL-MCNC: 1.15 MG/DL (ref 0.57–1)
DEPRECATED RDW RBC AUTO: 43.9 FL (ref 37–54)
EGFRCR SERPLBLD CKD-EPI 2021: 48 ML/MIN/1.73
EOSINOPHIL # BLD AUTO: 0.19 10*3/MM3 (ref 0–0.4)
EOSINOPHIL NFR BLD AUTO: 2.5 % (ref 0.3–6.2)
ERYTHROCYTE [DISTWIDTH] IN BLOOD BY AUTOMATED COUNT: 12.9 % (ref 12.3–15.4)
GLOBULIN UR ELPH-MCNC: 2.3 GM/DL
GLUCOSE SERPL-MCNC: 102 MG/DL (ref 65–99)
HCT VFR BLD AUTO: 40.9 % (ref 34–46.6)
HGB BLD-MCNC: 13.3 G/DL (ref 12–15.9)
IMM GRANULOCYTES # BLD AUTO: 0.02 10*3/MM3 (ref 0–0.05)
IMM GRANULOCYTES NFR BLD AUTO: 0.3 % (ref 0–0.5)
LYMPHOCYTES # BLD AUTO: 1.79 10*3/MM3 (ref 0.7–3.1)
LYMPHOCYTES NFR BLD AUTO: 23.4 % (ref 19.6–45.3)
MCH RBC QN AUTO: 29.9 PG (ref 26.6–33)
MCHC RBC AUTO-ENTMCNC: 32.5 G/DL (ref 31.5–35.7)
MCV RBC AUTO: 91.9 FL (ref 79–97)
MONOCYTES # BLD AUTO: 0.77 10*3/MM3 (ref 0.1–0.9)
MONOCYTES NFR BLD AUTO: 10.1 % (ref 5–12)
NEUTROPHILS NFR BLD AUTO: 4.84 10*3/MM3 (ref 1.7–7)
NEUTROPHILS NFR BLD AUTO: 63.3 % (ref 42.7–76)
PLATELET # BLD AUTO: 247 10*3/MM3 (ref 140–450)
PMV BLD AUTO: 9.2 FL (ref 6–12)
POTASSIUM SERPL-SCNC: 4.1 MMOL/L (ref 3.5–5.2)
PROT SERPL-MCNC: 7.1 G/DL (ref 6–8.5)
RBC # BLD AUTO: 4.45 10*6/MM3 (ref 3.77–5.28)
SODIUM SERPL-SCNC: 138 MMOL/L (ref 136–145)
WBC NRBC COR # BLD AUTO: 7.64 10*3/MM3 (ref 3.4–10.8)

## 2024-09-25 PROCEDURE — 1126F AMNT PAIN NOTED NONE PRSNT: CPT | Performed by: INTERNAL MEDICINE

## 2024-09-25 PROCEDURE — 36415 COLL VENOUS BLD VENIPUNCTURE: CPT

## 2024-09-25 PROCEDURE — 85025 COMPLETE CBC W/AUTO DIFF WBC: CPT | Performed by: INTERNAL MEDICINE

## 2024-09-25 PROCEDURE — 80053 COMPREHEN METABOLIC PANEL: CPT | Performed by: INTERNAL MEDICINE

## 2024-09-25 PROCEDURE — 99213 OFFICE O/P EST LOW 20 MIN: CPT | Performed by: INTERNAL MEDICINE

## 2024-09-25 RX ORDER — METFORMIN HCL 500 MG
TABLET, EXTENDED RELEASE 24 HR ORAL
COMMUNITY
Start: 2024-09-04

## 2024-09-25 RX ORDER — CYCLOSPORINE 0.5 MG/ML
EMULSION OPHTHALMIC
COMMUNITY
Start: 2024-07-25

## 2024-09-25 RX ORDER — HYDROCHLOROTHIAZIDE 12.5 MG/1
TABLET ORAL
COMMUNITY
Start: 2024-09-18

## 2024-09-25 RX ORDER — ATORVASTATIN CALCIUM 40 MG/1
40 TABLET, FILM COATED ORAL DAILY
COMMUNITY
Start: 2024-07-24 | End: 2025-07-24

## 2024-09-25 RX ORDER — PANTOPRAZOLE SODIUM 40 MG/1
TABLET, DELAYED RELEASE ORAL
COMMUNITY
Start: 2024-06-29

## 2024-09-25 RX ORDER — PHENOL 1.4 %
AEROSOL, SPRAY (ML) MUCOUS MEMBRANE DAILY
COMMUNITY

## 2025-07-30 ENCOUNTER — TRANSCRIBE ORDERS (OUTPATIENT)
Dept: ADMINISTRATIVE | Facility: HOSPITAL | Age: 80
End: 2025-07-30
Payer: MEDICARE

## 2025-07-30 DIAGNOSIS — Z12.31 VISIT FOR SCREENING MAMMOGRAM: Primary | ICD-10-CM

## 2025-08-15 ENCOUNTER — HOSPITAL ENCOUNTER (OUTPATIENT)
Dept: MAMMOGRAPHY | Facility: HOSPITAL | Age: 80
Discharge: HOME | End: 2025-08-15
Admitting: INTERNAL MEDICINE
Payer: MEDICARE

## 2025-08-15 DIAGNOSIS — Z12.31 VISIT FOR SCREENING MAMMOGRAM: ICD-10-CM

## 2025-08-15 PROCEDURE — 77067 SCR MAMMO BI INCL CAD: CPT

## 2025-08-15 PROCEDURE — 77063 BREAST TOMOSYNTHESIS BI: CPT
